# Patient Record
Sex: MALE | Race: WHITE | Employment: FULL TIME | URBAN - NONMETROPOLITAN AREA
[De-identification: names, ages, dates, MRNs, and addresses within clinical notes are randomized per-mention and may not be internally consistent; named-entity substitution may affect disease eponyms.]

---

## 2018-03-10 ENCOUNTER — HOSPITAL ENCOUNTER (INPATIENT)
Age: 63
LOS: 1 days | Discharge: HOME | DRG: 310 | End: 2018-03-11
Attending: INTERNAL MEDICINE | Admitting: INTERNAL MEDICINE
Payer: COMMERCIAL

## 2018-03-10 ENCOUNTER — OUTSIDE FACILITY SERVICE (OUTPATIENT)
Dept: CARDIOLOGY | Facility: CLINIC | Age: 63
End: 2018-03-10

## 2018-03-10 LAB
PRO-BNP: 311 PG/ML (ref 0–900)
TROPONIN: <0.01 NG/ML (ref 0–0.03)

## 2018-03-10 PROCEDURE — 93010 ELECTROCARDIOGRAM REPORT: CPT | Performed by: INTERNAL MEDICINE

## 2018-03-10 PROCEDURE — 6370000000 HC RX 637 (ALT 250 FOR IP): Performed by: INTERNAL MEDICINE

## 2018-03-10 PROCEDURE — 83880 ASSAY OF NATRIURETIC PEPTIDE: CPT

## 2018-03-10 PROCEDURE — 36415 COLL VENOUS BLD VENIPUNCTURE: CPT

## 2018-03-10 PROCEDURE — 84484 ASSAY OF TROPONIN QUANT: CPT

## 2018-03-10 PROCEDURE — 2140000000 HC CCU INTERMEDIATE R&B

## 2018-03-10 PROCEDURE — 6360000002 HC RX W HCPCS: Performed by: INTERNAL MEDICINE

## 2018-03-10 PROCEDURE — 2580000003 HC RX 258: Performed by: INTERNAL MEDICINE

## 2018-03-10 PROCEDURE — 99223 1ST HOSP IP/OBS HIGH 75: CPT | Performed by: INTERNAL MEDICINE

## 2018-03-10 RX ORDER — ONDANSETRON 2 MG/ML
4 INJECTION INTRAMUSCULAR; INTRAVENOUS EVERY 6 HOURS PRN
Status: DISCONTINUED | OUTPATIENT
Start: 2018-03-10 | End: 2018-03-11 | Stop reason: HOSPADM

## 2018-03-10 RX ORDER — SODIUM CHLORIDE 0.9 % (FLUSH) 0.9 %
10 SYRINGE (ML) INJECTION PRN
Status: DISCONTINUED | OUTPATIENT
Start: 2018-03-10 | End: 2018-03-11 | Stop reason: HOSPADM

## 2018-03-10 RX ORDER — PROPAFENONE HYDROCHLORIDE 225 MG/1
450 TABLET, FILM COATED ORAL EVERY 8 HOURS SCHEDULED
Status: COMPLETED | OUTPATIENT
Start: 2018-03-10 | End: 2018-03-10

## 2018-03-10 RX ORDER — SODIUM CHLORIDE 0.9 % (FLUSH) 0.9 %
10 SYRINGE (ML) INJECTION EVERY 12 HOURS SCHEDULED
Status: DISCONTINUED | OUTPATIENT
Start: 2018-03-10 | End: 2018-03-11 | Stop reason: HOSPADM

## 2018-03-10 RX ORDER — ASPIRIN 81 MG/1
81 TABLET, CHEWABLE ORAL DAILY
Status: DISCONTINUED | OUTPATIENT
Start: 2018-03-11 | End: 2018-03-11 | Stop reason: HOSPADM

## 2018-03-10 RX ORDER — ACETAMINOPHEN 325 MG/1
650 TABLET ORAL EVERY 4 HOURS PRN
Status: DISCONTINUED | OUTPATIENT
Start: 2018-03-10 | End: 2018-03-11 | Stop reason: HOSPADM

## 2018-03-10 RX ADMIN — Medication 10 ML: at 21:55

## 2018-03-10 RX ADMIN — ENOXAPARIN SODIUM 90 MG: 100 INJECTION SUBCUTANEOUS at 21:55

## 2018-03-10 RX ADMIN — PROPAFENONE HYDROCHLORIDE 450 MG: 225 TABLET, FILM COATED ORAL at 21:55

## 2018-03-11 ENCOUNTER — APPOINTMENT (OUTPATIENT)
Dept: CT IMAGING | Age: 63
DRG: 310 | End: 2018-03-11
Attending: INTERNAL MEDICINE
Payer: COMMERCIAL

## 2018-03-11 VITALS
HEART RATE: 68 BPM | SYSTOLIC BLOOD PRESSURE: 124 MMHG | OXYGEN SATURATION: 96 % | RESPIRATION RATE: 14 BRPM | WEIGHT: 201 LBS | TEMPERATURE: 98.2 F | BODY MASS INDEX: 28.14 KG/M2 | HEIGHT: 71 IN | DIASTOLIC BLOOD PRESSURE: 76 MMHG

## 2018-03-11 PROBLEM — I48.91 ATRIAL FIBRILLATION (HCC): Status: ACTIVE | Noted: 2018-03-11

## 2018-03-11 LAB
ANION GAP SERPL CALCULATED.3IONS-SCNC: 10 MMOL/L (ref 7–19)
BASOPHILS ABSOLUTE: 0.1 K/UL (ref 0–0.2)
BASOPHILS RELATIVE PERCENT: 1 % (ref 0–1)
BUN BLDV-MCNC: 13 MG/DL (ref 8–23)
C-REACTIVE PROTEIN: 0.05 MG/DL (ref 0–0.5)
CALCIUM SERPL-MCNC: 8.7 MG/DL (ref 8.8–10.2)
CHLORIDE BLD-SCNC: 103 MMOL/L (ref 98–111)
CHOLESTEROL, TOTAL: 192 MG/DL (ref 160–199)
CO2: 27 MMOL/L (ref 22–29)
CREAT SERPL-MCNC: 1 MG/DL (ref 0.5–1.2)
EOSINOPHILS ABSOLUTE: 0.1 K/UL (ref 0–0.6)
EOSINOPHILS RELATIVE PERCENT: 1.5 % (ref 0–5)
FOLATE: 11 NG/ML (ref 4.5–32.2)
GFR NON-AFRICAN AMERICAN: >60
GLUCOSE BLD-MCNC: 180 MG/DL (ref 74–109)
GLUCOSE BLD-MCNC: 88 MG/DL (ref 70–99)
HCT VFR BLD CALC: 40.2 % (ref 42–52)
HDLC SERPL-MCNC: 36 MG/DL (ref 55–121)
HEMOGLOBIN: 13.6 G/DL (ref 14–18)
INR BLD: 1.11 (ref 0.88–1.18)
LDL CHOLESTEROL CALCULATED: 133 MG/DL
LV EF: 50 %
LV EF: 58 %
LVEF MODALITY: NORMAL
LVEF MODALITY: NORMAL
LYMPHOCYTES ABSOLUTE: 2.5 K/UL (ref 1.1–4.5)
LYMPHOCYTES RELATIVE PERCENT: 41 % (ref 20–40)
MAGNESIUM: 2.5 MG/DL (ref 1.6–2.4)
MCH RBC QN AUTO: 32.1 PG (ref 27–31)
MCHC RBC AUTO-ENTMCNC: 33.8 G/DL (ref 33–37)
MCV RBC AUTO: 94.8 FL (ref 80–94)
MONOCYTES ABSOLUTE: 0.6 K/UL (ref 0–0.9)
MONOCYTES RELATIVE PERCENT: 9.8 % (ref 0–10)
NEUTROPHILS ABSOLUTE: 2.8 K/UL (ref 1.5–7.5)
NEUTROPHILS RELATIVE PERCENT: 46.4 % (ref 50–65)
PDW BLD-RTO: 12.6 % (ref 11.5–14.5)
PERFORMED ON: NORMAL
PLATELET # BLD: 190 K/UL (ref 130–400)
PMV BLD AUTO: 10.3 FL (ref 9.4–12.4)
POTASSIUM SERPL-SCNC: 3.8 MMOL/L (ref 3.5–5)
PROTHROMBIN TIME: 14.2 SEC (ref 12–14.6)
RBC # BLD: 4.24 M/UL (ref 4.7–6.1)
SEDIMENTATION RATE, ERYTHROCYTE: 2 MM/HR (ref 0–15)
SODIUM BLD-SCNC: 140 MMOL/L (ref 136–145)
TRIGL SERPL-MCNC: 113 MG/DL (ref 0–149)
TROPONIN: <0.01 NG/ML (ref 0–0.03)
TROPONIN: <0.01 NG/ML (ref 0–0.03)
TSH SERPL DL<=0.05 MIU/L-ACNC: 1.79 UIU/ML (ref 0.27–4.2)
VITAMIN B-12: 356 PG/ML (ref 211–946)
WBC # BLD: 6 K/UL (ref 4.8–10.8)

## 2018-03-11 PROCEDURE — 82746 ASSAY OF FOLIC ACID SERUM: CPT

## 2018-03-11 PROCEDURE — 70450 CT HEAD/BRAIN W/O DYE: CPT

## 2018-03-11 PROCEDURE — 83735 ASSAY OF MAGNESIUM: CPT

## 2018-03-11 PROCEDURE — 80048 BASIC METABOLIC PNL TOTAL CA: CPT

## 2018-03-11 PROCEDURE — 99252 IP/OBS CONSLTJ NEW/EST SF 35: CPT | Performed by: INTERNAL MEDICINE

## 2018-03-11 PROCEDURE — 2580000003 HC RX 258: Performed by: INTERNAL MEDICINE

## 2018-03-11 PROCEDURE — 85652 RBC SED RATE AUTOMATED: CPT

## 2018-03-11 PROCEDURE — 82948 REAGENT STRIP/BLOOD GLUCOSE: CPT

## 2018-03-11 PROCEDURE — 85610 PROTHROMBIN TIME: CPT

## 2018-03-11 PROCEDURE — 93306 TTE W/DOPPLER COMPLETE: CPT

## 2018-03-11 PROCEDURE — 85025 COMPLETE CBC W/AUTO DIFF WBC: CPT

## 2018-03-11 PROCEDURE — 87070 CULTURE OTHR SPECIMN AEROBIC: CPT

## 2018-03-11 PROCEDURE — 84484 ASSAY OF TROPONIN QUANT: CPT

## 2018-03-11 PROCEDURE — 93350 STRESS TTE ONLY: CPT

## 2018-03-11 PROCEDURE — 99232 SBSQ HOSP IP/OBS MODERATE 35: CPT | Performed by: INTERNAL MEDICINE

## 2018-03-11 PROCEDURE — 82607 VITAMIN B-12: CPT

## 2018-03-11 PROCEDURE — 80061 LIPID PANEL: CPT

## 2018-03-11 PROCEDURE — 86140 C-REACTIVE PROTEIN: CPT

## 2018-03-11 PROCEDURE — 93880 EXTRACRANIAL BILAT STUDY: CPT

## 2018-03-11 PROCEDURE — 36415 COLL VENOUS BLD VENIPUNCTURE: CPT

## 2018-03-11 PROCEDURE — 93005 ELECTROCARDIOGRAM TRACING: CPT

## 2018-03-11 PROCEDURE — 84443 ASSAY THYROID STIM HORMONE: CPT

## 2018-03-11 RX ORDER — ATROPINE SULFATE 0.1 MG/ML
INJECTION INTRAVENOUS
Status: DISPENSED
Start: 2018-03-11 | End: 2018-03-11

## 2018-03-11 RX ORDER — ASPIRIN 81 MG/1
81 TABLET, CHEWABLE ORAL DAILY
Qty: 30 TABLET | Refills: 3 | Status: SHIPPED | OUTPATIENT
Start: 2018-03-11

## 2018-03-11 RX ADMIN — Medication 10 ML: at 09:00

## 2018-03-11 ASSESSMENT — ENCOUNTER SYMPTOMS
BLURRED VISION: 0
NAUSEA: 0
PHOTOPHOBIA: 0
ORTHOPNEA: 0
VOMITING: 0
RESPIRATORY NEGATIVE: 1
DOUBLE VISION: 0
HEARTBURN: 0
ABDOMINAL PAIN: 0

## 2018-03-11 NOTE — DISCHARGE SUMMARY
was not documented. I don't think he is going to require antiarrhythmic therapy unless episodes become more frequent. I discussed anti-coagulation therapy with the patient and his wife. 1st of all he has a very low chads vascular score. 2nd, he recognized immediately that something was going on with his heart rhythm. I think we can leave him off anticoagulation therapy at the present time however I have advised the patient that he needs to present for medical care as soon as he recognizes his heart is out of rhythm. Additionally anticoagulation therapy should start if he continues to have episodes of atrial fibrillation. With the above noted and all questions answered I felt that the patient could be discharged home to continue outpatient follow-up. DISCHARGE MEDICATIONS:    See medication reconciliation. Electronically signed by ELIF Dee MD on 3/11/18

## 2018-03-11 NOTE — PROGRESS NOTES
[id number] [facility]  Progress Note                            Date:  3/11/2018  Patient: Harvinder Hernandez  Admission:  3/10/2018  8:46 PM  Admit DX: Atrial fibrillation with RVR  Age:  60 y.o., 1955     LOS: 1 day     Reason for evaluation:   atrial fibrillation      SUBJECTIVE:    The patient was seen and examined. Notes and labs reviewed. There Were complications over night. Patient's cardiac review of systems: negative. The patient is generally feeling better. When patient converted to sinus rhythm last night he was profoundly bradycardic and had a syncopal episode while in the bathroom. He was moved back to CCU and monitoring has continued. OBJECTIVE:    Telemetry: sinus rhythm  BP (!) 135/92   Pulse 73   Temp 98.4 °F (36.9 °C) (Temporal)   Resp 19   Ht 5' 11\" (1.803 m)   Wt 201 lb (91.2 kg)   SpO2 98%   BMI 28.03 kg/m²     Intake/Output Summary (Last 24 hours) at 03/11/18 1213  Last data filed at 03/11/18 0900   Gross per 24 hour   Intake              120 ml   Output              400 ml   Net             -280 ml       Labs:   CBC:   Recent Labs      03/11/18   0104   WBC  6.0   HGB  13.6*   HCT  40.2*   PLT  190     BMP: Recent Labs      03/11/18 0104   NA  140   K  3.8   CO2  27   BUN  13   CREATININE  1.0   LABGLOM  >60   GLUCOSE  180*     BNP: No results for input(s): BNP in the last 72 hours. PT/INR:   Recent Labs      03/11/18 0104   PROTIME  14.2   INR  1.11     APTT:No results for input(s): APTT in the last 72 hours. CARDIAC ENZYMES:  Recent Labs      03/10/18   2145  03/11/18   0104  03/11/18   0856   TROPONINI  <0.01  <0.01  <0.01     FASTING LIPID PANEL:  Lab Results   Component Value Date    HDL 36 03/11/2018    LDLCALC 133 03/11/2018    TRIG 113 03/11/2018     LIVER PROFILE:No results for input(s): AST, ALT, LABALBU in the last 72 hours.         PFSH:  No change    ROSS:  No change      Physical Examination:  BP (!) 135/92   Pulse 73   Temp 98.4 °F (36.9

## 2018-03-11 NOTE — CONSULTS
vision and photophobia. Respiratory: Negative. Cardiovascular: Negative. Negative for chest pain, palpitations and orthopnea. Gastrointestinal: Negative for abdominal pain, heartburn, nausea and vomiting. Genitourinary: Negative. Musculoskeletal: Negative. Skin: Negative. Neurological: Negative for dizziness, tingling, tremors, sensory change and headaches. Endo/Heme/Allergies: Negative. Psychiatric/Behavioral: Negative. PHYSICAL EXAM:  /71   Pulse 74   Temp 97.4 °F (36.3 °C) (Temporal)   Resp 11   Ht 5' 11\" (1.803 m)   Wt 201 lb (91.2 kg)   SpO2 94%   BMI 28.03 kg/m²   General appearance: alert, appears stated age, cooperative and no distress  Head: Normocephalic, without obvious abnormality, atraumatic  Eyes: conjunctivae/corneas clear. PERRL, EOM's intact. Neck: no adenopathy, no carotid bruit, no JVD, supple, symmetrical, trachea midline and thyroid not enlarged, symmetric, no tenderness/mass/nodules  Lungs: clear to auscultation bilaterally,no rales or wheezes   Heart: regular rate and rhythm, S1, S2 normal, no murmur,  regular rate and rhythm   Abdomen:soft, non-tender;   Extremities:pulses +  Skin: Skin color, texture, turgor normal. No rashes or lesions  Neurologic: Alert and oriented X 3, normal strength and tone. DATA:  Imaging:  CT Head WO Contrast    (Results Pending)       CBC with Differential:    Lab Results   Component Value Date    WBC 6.0 03/11/2018    RBC 4.24 03/11/2018    HGB 13.6 03/11/2018    HCT 40.2 03/11/2018     03/11/2018    MCV 94.8 03/11/2018    LYMPHOPCT 41.0 03/11/2018     BMP:  No results found for: NA, K, CL, CO2, BUN, CREATININE, CALCIUM, GFRAA, LABGLOM, GLUCOSE  PT/INR:  No results found for: PTINR      ASSESSMENT:      Syncope possible vasovagal also could have been medications  Fall  A-fib    Suggestion    1. check chemistry  2.  Ct head w/o contrast.  3. Further rhythm issues per cardiology    [address] [place] [address],

## 2018-03-11 NOTE — H&P
[id number] [facility]      History and Physical       Date of Admission:  3/10/2018  8:46 PM    Date of Initially Being Seen / Consultation:  3/10/18    Cardiologist:  ELIF Sahu MD    Attending: Dr. Lisa Gale      PCP:  Jeanna Davis    Reason for Consultation or Admission / Chief Complaint:  palpitations    SUBJECTIVE AND HISTORY OF PRESENT ILLNESS:    Source of the history:  Patient, family, previous inpatient and outpatient records in [facility], and from Notes from Marbella Guillermo is a 60 y.o. male who presents to [facility] PCU with symptoms / signs / problem or diagnosis of new onset atrial fibrillation. Patient began having palpitations this afternoon and went to the emergency room in [place] and is now transferred here by ambulance for new onset atrial fibrillation. He has not had any chest pain or dyspnea no PND or orthopnea syncope or near syncope. CARDIAC RISK PROFILE:    Risk Factor Yes / No / Unknown       Cigarette Use See below   Diabetes Mellitus See below   Family History of CAD See below   Hypercholesteremia See below   Hypertension See below          Cardiac Specific Problems:    Specialty Problems     None            PRIOR CARDIAC PROBLEM LIST  (IF APPLICABLE):    He thinks he had another episode of atrial fibrillation in the past but it was not diagnosed      Past Medical History:  No past medical history on file. Past Surgical History:  Past Surgical History:   Procedure Laterality Date    HERNIA REPAIR         Home Medications:   Prior to Admission medications    Not on File        Facility Administered Medications: Allergies:    Patient has no known allergies. Social History:    Social History     Social History    Marital status:      Spouse name: N/A    Number of children: N/A    Years of education: N/A     Occupational History    Not on file.      Social History Main Topics    Smoking status: Never Smoker    Smokeless Normocephalic without evidence of old or recent trauma. Sclerae clear. Conjunctivae pink. EOMs intact. Pupils equal and round. Tympanic membranes not visualized. No epistaxis, runny nose. No lesions on lips or buccal mucosa. Tongue protrudes in midline and is well papillated. NECK:  Supple without mass or JVD. Carotid pulses 2+ to palpation bilaterally without bruit. No thyromegaly noted. CARDIOVASCULAR:  Regular rate and rhythm without S3, S4 or murmur. PULMONARY:  Equal bilateral expansion. Clear to auscultation and percussion. ABDOMEN:  Soft, non tender. Bowel sounds X4 quadrants. No hepatomegaly, splenomegaly or palpable mass. MUSCULOSKELETAL:  Negative. UPPER EXTREMITIES:  Radial pulses palpable bilaterally. No cyanosis, clubbing, or edema. LOWER EXTREMITIES:  Femoral, popliteal, dorsalis pedis, and posterior tibial pulses 2+ to palpation bilaterally. No cyanosis, clubbing, or edema or signs of atheroembolic event. NEUROLOGIC:  Physiologic. SKIN:  Warm, dry, intact. LABORATORY EVALUATION & TESTING:    I have personally reviewed and interpreted the results of the following diagnostic testing      EKG and or Telemetry:  which was personally reviewed me:  Atrial fibrillation rhythm, 70 bpm    Troponin:  pending for myocardial necrosis    CBC: No results for input(s): WBC, HGB, HCT, MCV, PLT in the last 72 hours. BMP: No results for input(s): NA, K, CL, CO2, PHOS, BUN, CREATININE in the last 72 hours. Invalid input(s): CA  Cardiac Enzymes: No results for input(s): CKTOTAL, CKMB, CKMBINDEX, TROPONINI in the last 72 hours. PT/INR: No results for input(s): PROTIME, INR in the last 72 hours. APTT: No results for input(s): APTT in the last 72 hours.   Liver Profile:  No results found for: AST, ALT, ALB, BILIDIR, BILITOT, ALKPHOS, GGT, 5NUCNo results found for: CHOL, HDL, TRIG  TSH:  No results found for: TSH  UA: No results found for: NITRITE, COLORU, PHUR, LABCAST, WBCUA, RBCUA,

## 2018-03-11 NOTE — PROGRESS NOTES
Dr. Marquis Henry at pt bedside. Plan for echo and stress test today. MD explained condition and plan to pt and wife; questions answered. Will continue to monitor.

## 2018-03-12 LAB — MRSA CULTURE ONLY: NORMAL

## 2018-03-15 LAB
EKG P AXIS: 74 DEGREES
EKG P-R INTERVAL: 182 MS
EKG Q-T INTERVAL: 396 MS
EKG QRS DURATION: 86 MS
EKG QTC CALCULATION (BAZETT): 415 MS
EKG T AXIS: 47 DEGREES

## 2018-03-19 ENCOUNTER — OUTSIDE FACILITY SERVICE (OUTPATIENT)
Dept: CARDIOLOGY | Facility: CLINIC | Age: 63
End: 2018-03-19

## 2018-03-19 ENCOUNTER — DOCUMENTATION (OUTPATIENT)
Dept: CARDIOLOGY | Facility: CLINIC | Age: 63
End: 2018-03-19

## 2018-03-19 DIAGNOSIS — I48.0 PAROXYSMAL ATRIAL FIBRILLATION (HCC): Primary | ICD-10-CM

## 2018-03-19 PROCEDURE — 99253 IP/OBS CNSLTJ NEW/EST LOW 45: CPT | Performed by: NURSE PRACTITIONER

## 2018-04-18 ENCOUNTER — OFFICE VISIT (OUTPATIENT)
Dept: CARDIOLOGY | Facility: CLINIC | Age: 63
End: 2018-04-18

## 2018-04-18 VITALS
HEART RATE: 68 BPM | WEIGHT: 198 LBS | HEIGHT: 71 IN | BODY MASS INDEX: 27.72 KG/M2 | DIASTOLIC BLOOD PRESSURE: 70 MMHG | OXYGEN SATURATION: 98 % | SYSTOLIC BLOOD PRESSURE: 110 MMHG

## 2018-04-18 DIAGNOSIS — I48.0 PAROXYSMAL ATRIAL FIBRILLATION (HCC): ICD-10-CM

## 2018-04-18 PROCEDURE — 99213 OFFICE O/P EST LOW 20 MIN: CPT | Performed by: INTERNAL MEDICINE

## 2018-04-18 PROCEDURE — 93000 ELECTROCARDIOGRAM COMPLETE: CPT | Performed by: INTERNAL MEDICINE

## 2018-04-18 NOTE — PROGRESS NOTES
Reason for Visit: cardiovascular follow up.    HPI:  Yany Newton is a 63 y.o. male is here today for follow-up.  He was recently diagnosed with atrial fibrillation.  He had 3 recurrent episodes of atrial fibrillation.  He was later seen in consultation by Kyra Rome on 03/19/2018.  At this time diltiazem was changed to rhythm control with dronedarone.  Patient presents for hospital follow-up today.  He remains in sinus rhythm on EKG.  He recently saw EP and has been set up for an ablation on 5/2/18.  He has not had any evidence of recurrence since being on dronedarone.      Previous Cardiac Testing and Procedures:  - Echo (08/11/2015) normal biventricular systolic function, grade 1 diastolic dysfunction, borderline LVH  - EKG (09/02/2015) normal sinus rhythm  - Stress echo (03/11/2018) low risk for ischemia  - Echo (03/11/2018) EF 55-60%, trivial TR, normal RVSP    Patient Active Problem List   Diagnosis   • Paroxysmal atrial fibrillation   • Atrial fibrillation       Social History   Substance Use Topics   • Smoking status: Passive Smoke Exposure - Never Smoker   • Smokeless tobacco: Never Used   • Alcohol use Yes      Comment: OCC       Family History   Problem Relation Age of Onset   • Stroke Mother    • Atrial fibrillation Mother    • Heart disease Father    • Cancer Father        The following portions of the patient's history were reviewed and updated as appropriate: allergies, current medications, past family history, past medical history, past social history, past surgical history and problem list.      Current Outpatient Prescriptions:   •  apixaban (ELIQUIS) 5 MG tablet tablet, Take 1 tablet by mouth 2 (Two) Times a Day., Disp: 60 tablet, Rfl: 11  •  dronedarone (MULTAQ) 400 MG tablet, Take 1 tablet by mouth 2 (Two) Times a Day With Meals., Disp: 60 tablet, Rfl: 11    Review of Systems   Constitution: Positive for weakness and malaise/fatigue. Negative for chills, decreased appetite and fever.  "  HENT: Positive for nosebleeds. Negative for congestion.    Eyes: Negative for blurred vision and double vision.   Cardiovascular: Positive for chest pain (TIGHTNESS) and irregular heartbeat. Negative for leg swelling and palpitations.   Respiratory: Negative for cough and shortness of breath.    Endocrine: Negative for cold intolerance and heat intolerance.   Hematologic/Lymphatic: Does not bruise/bleed easily.   Skin: Negative for dry skin, itching and rash.   Musculoskeletal: Positive for back pain. Negative for joint pain, muscle cramps and neck pain.   Gastrointestinal: Positive for constipation. Negative for abdominal pain, diarrhea, heartburn and melena.   Genitourinary: Positive for frequency and nocturia. Negative for dysuria and hematuria.   Neurological: Negative for dizziness, headaches, light-headedness and loss of balance.   Psychiatric/Behavioral: Negative for depression. The patient is nervous/anxious. The patient does not have insomnia.        Objective   /70 (BP Location: Left arm, Patient Position: Sitting, Cuff Size: Adult)   Pulse 68   Ht 180.3 cm (71\")   Wt 89.8 kg (198 lb)   SpO2 98%   BMI 27.62 kg/m²   Physical Exam   Constitutional: He is oriented to person, place, and time. He appears well-developed and well-nourished.   HENT:   Head: Normocephalic and atraumatic.   Cardiovascular: Normal rate, regular rhythm and normal heart sounds.    No murmur heard.  Pulmonary/Chest: Effort normal and breath sounds normal.   Musculoskeletal: He exhibits no edema.   Neurological: He is alert and oriented to person, place, and time.   Skin: Skin is warm and dry.   Psychiatric: He has a normal mood and affect.       ECG 12 Lead  Date/Time: 4/18/2018 2:21 PM  Performed by: YANIRA BRANDT  Authorized by: YANIRA BRANDT   Comparison: compared with previous ECG from 9/2/2015  Similar to previous ECG  Rhythm: sinus rhythm  Rate: normal  Clinical impression: normal ECG              ICD-10-CM " ICD-9-CM   1. Paroxysmal atrial fibrillation I48.0 427.31         Assessment/Plan:  1.  Paroxysmal atrial fibrillation: Remains in sinus rhythm today on EKG.  Continue rhythm control with dronedarone and anticoagulation with Eliquis.  Is now following with EP and is scheduled to have an ablation next month.  Eliquis refilled.

## 2018-06-01 ENCOUNTER — TELEPHONE (OUTPATIENT)
Dept: CARDIOLOGY | Facility: CLINIC | Age: 63
End: 2018-06-01

## 2018-06-01 NOTE — TELEPHONE ENCOUNTER
Pt said that he is scheduled to stop his multaq after tomorrow.  He wants to know what symptoms he is needing to watch for and be cautious of after he stops taking it and what he needs to do if they occur.

## 2018-06-01 NOTE — TELEPHONE ENCOUNTER
Spoke with pt,  He said that Dr Pineda office is the one who told him to stop the medication, after his heart ablation.   I told him it's an heart arrhythmia med and just pay attention to that,  If she has problems to give us a call.  He voiced understanding.

## 2020-11-03 PROBLEM — I48.91 ATRIAL FIBRILLATION (HCC): Status: RESOLVED | Noted: 2018-03-11 | Resolved: 2020-11-03
